# Patient Record
Sex: MALE | Race: WHITE | NOT HISPANIC OR LATINO | Employment: FULL TIME | URBAN - METROPOLITAN AREA
[De-identification: names, ages, dates, MRNs, and addresses within clinical notes are randomized per-mention and may not be internally consistent; named-entity substitution may affect disease eponyms.]

---

## 2023-10-04 DIAGNOSIS — I25.2 OLD MYOCARDIAL INFARCTION: ICD-10-CM

## 2023-10-06 RX ORDER — CLOPIDOGREL BISULFATE 75 MG/1
75 TABLET ORAL DAILY
Qty: 90 TABLET | Refills: 3 | Status: SHIPPED | OUTPATIENT
Start: 2023-10-06

## 2023-12-04 DIAGNOSIS — E78.5 HYPERLIPIDEMIA, UNSPECIFIED: Primary | ICD-10-CM

## 2023-12-05 RX ORDER — ATORVASTATIN CALCIUM 80 MG/1
80 TABLET, FILM COATED ORAL DAILY
Qty: 90 TABLET | Refills: 0 | Status: SHIPPED | OUTPATIENT
Start: 2023-12-05 | End: 2024-02-20 | Stop reason: SDUPTHER

## 2024-02-19 DIAGNOSIS — E78.5 HYPERLIPIDEMIA, UNSPECIFIED: Primary | ICD-10-CM

## 2024-02-20 RX ORDER — ATORVASTATIN CALCIUM 80 MG/1
80 TABLET, FILM COATED ORAL DAILY
Qty: 90 TABLET | Refills: 0 | Status: SHIPPED | OUTPATIENT
Start: 2024-02-20 | End: 2024-05-17

## 2024-02-20 RX ORDER — ATORVASTATIN CALCIUM 80 MG/1
80 TABLET, FILM COATED ORAL DAILY
Qty: 90 TABLET | Refills: 0 | OUTPATIENT
Start: 2024-02-20

## 2024-02-26 PROBLEM — R53.83 FATIGUE: Status: ACTIVE | Noted: 2019-10-21

## 2024-02-26 PROBLEM — K21.9 GERD (GASTROESOPHAGEAL REFLUX DISEASE): Status: ACTIVE | Noted: 2024-02-26

## 2024-02-26 PROBLEM — M48.02 FORAMINAL STENOSIS OF CERVICAL REGION: Status: ACTIVE | Noted: 2024-02-26

## 2024-02-26 PROBLEM — F17.200 NICOTINE DEPENDENCE: Status: RESOLVED | Noted: 2024-02-26 | Resolved: 2024-02-26

## 2024-02-26 PROBLEM — H69.93 DISORDER OF BOTH EUSTACHIAN TUBES: Status: ACTIVE | Noted: 2024-02-26

## 2024-02-26 PROBLEM — M77.31 CALCANEAL SPUR OF RIGHT FOOT: Status: ACTIVE | Noted: 2024-02-26

## 2024-02-26 PROBLEM — M25.511 ACUTE PAIN OF RIGHT SHOULDER: Status: ACTIVE | Noted: 2024-02-26

## 2024-02-26 PROBLEM — N40.0 BENIGN ENLARGEMENT OF PROSTATE: Status: ACTIVE | Noted: 2024-02-26

## 2024-02-26 PROBLEM — R51.9 HEADACHE, OCCIPITAL: Status: ACTIVE | Noted: 2019-10-21

## 2024-02-26 PROBLEM — M76.61 ACHILLES TENDINITIS OF RIGHT LOWER EXTREMITY: Status: ACTIVE | Noted: 2024-02-26

## 2024-02-26 PROBLEM — M25.559 ARTHRALGIA OF HIP: Status: ACTIVE | Noted: 2024-02-26

## 2024-02-26 PROBLEM — L84 CALLUS OF FOOT: Status: ACTIVE | Noted: 2024-02-26

## 2024-02-26 PROBLEM — R10.13 DYSPEPSIA: Status: ACTIVE | Noted: 2024-02-26

## 2024-02-26 PROBLEM — J02.9 ACUTE PHARYNGITIS: Status: ACTIVE | Noted: 2019-09-14

## 2024-02-26 PROBLEM — K29.90 GASTRITIS AND DUODENITIS: Status: ACTIVE | Noted: 2024-02-26

## 2024-02-26 PROBLEM — H60.399 ACUTE INFECTIVE OTITIS EXTERNA: Status: ACTIVE | Noted: 2019-09-14

## 2024-02-26 PROBLEM — M21.959 DEFORMITY OF LOWER EXTREMITY: Status: ACTIVE | Noted: 2024-02-26

## 2024-02-26 PROBLEM — I25.2 HISTORY OF NON-ST ELEVATION MYOCARDIAL INFARCTION (NSTEMI): Status: ACTIVE | Noted: 2024-02-26

## 2024-02-26 PROBLEM — K44.9 HIATAL HERNIA: Status: ACTIVE | Noted: 2024-02-26

## 2024-02-26 PROBLEM — I25.10 CAD (CORONARY ARTERY DISEASE): Status: ACTIVE | Noted: 2024-02-26

## 2024-02-26 PROBLEM — Z95.5 PRESENCE OF STENT IN RIGHT CORONARY ARTERY: Status: ACTIVE | Noted: 2024-02-26

## 2024-02-26 PROBLEM — R06.83 SNORING: Status: ACTIVE | Noted: 2024-02-26

## 2024-02-26 PROBLEM — R06.02 MILD SHORTNESS OF BREATH: Status: ACTIVE | Noted: 2024-02-26

## 2024-02-26 PROBLEM — E78.1 HIGH TRIGLYCERIDES: Status: ACTIVE | Noted: 2024-02-26

## 2024-02-26 PROBLEM — E78.5 HYPERLIPIDEMIA: Status: ACTIVE | Noted: 2024-02-26

## 2024-02-26 PROBLEM — R59.0 LYMPHADENOPATHY, CERVICAL: Status: ACTIVE | Noted: 2024-02-26

## 2024-02-26 PROBLEM — D17.20 LIPOMA OF LOWER EXTREMITY: Status: ACTIVE | Noted: 2024-02-26

## 2024-02-27 ENCOUNTER — OFFICE VISIT (OUTPATIENT)
Dept: CARDIOLOGY | Facility: HOSPITAL | Age: 64
End: 2024-02-27
Payer: COMMERCIAL

## 2024-02-27 VITALS
HEART RATE: 53 BPM | WEIGHT: 194 LBS | SYSTOLIC BLOOD PRESSURE: 116 MMHG | OXYGEN SATURATION: 98 % | BODY MASS INDEX: 27.77 KG/M2 | DIASTOLIC BLOOD PRESSURE: 70 MMHG | HEIGHT: 70 IN

## 2024-02-27 DIAGNOSIS — I25.10 CORONARY ARTERY DISEASE INVOLVING NATIVE CORONARY ARTERY OF NATIVE HEART, UNSPECIFIED WHETHER ANGINA PRESENT: ICD-10-CM

## 2024-02-27 DIAGNOSIS — Z95.5 PRESENCE OF STENT IN RIGHT CORONARY ARTERY: ICD-10-CM

## 2024-02-27 DIAGNOSIS — E78.5 HYPERLIPIDEMIA, UNSPECIFIED HYPERLIPIDEMIA TYPE: Primary | ICD-10-CM

## 2024-02-27 PROCEDURE — 99214 OFFICE O/P EST MOD 30 MIN: CPT | Performed by: NURSE PRACTITIONER

## 2024-02-27 PROCEDURE — 93005 ELECTROCARDIOGRAM TRACING: CPT | Performed by: NURSE PRACTITIONER

## 2024-02-27 PROCEDURE — 93010 ELECTROCARDIOGRAM REPORT: CPT | Performed by: INTERNAL MEDICINE

## 2024-02-27 RX ORDER — ASPIRIN 81 MG/1
1 TABLET ORAL DAILY
COMMUNITY
Start: 2019-04-05

## 2024-02-27 NOTE — PATIENT INSTRUCTIONS
Continue current cardiovascular medications  Check blood work CBC, CMP, and lipid panel  Follow up in 1 year  Stop smoking!

## 2024-02-27 NOTE — PROGRESS NOTES
Primary Care Physician: Fausto Barger DO  Date of Visit: 02/27/2024  4:00 PM EST  Location of visit: [facility]     Chief Complaint:   Chief Complaint   Patient presents with    Follow-up    Hyperlipidemia    Coronary Artery Disease        HPI / Summary:   Placido Ash is a 63 y.o. male presents for followup. Seen in collaboration with Dr. Moody. He has no particular complaints. He is physically active and exercises walking/running for 1 mile and lifting weights 2-3 times per week without chest pain or shortness of breath. He restarted smoking. The patient denies chest pain, shortness of breath, palpitations, lightheadedness, syncope, orthopnea, paroxysmal nocturnal dyspnea, lower extremity edema, or bleeding problems.              Past Medical History:  Past Medical History:   Diagnosis Date    Abnormal weight loss 09/27/2019    Weight loss, unintentional    Anorexia 09/27/2019    Poor appetite    Fever, unspecified 09/27/2019    Fever, unknown origin    Headache, unspecified 09/27/2019    Intractable headache    Nausea 09/27/2019    Nausea in adult    Old myocardial infarction 08/13/2019    History of MI (myocardial infarction)    Personal history of other diseases of the respiratory system 09/12/2019    History of sore throat    Personal history of other infectious and parasitic diseases 11/11/2019    History of viral infection    Personal history of other specified conditions 11/11/2019    History of gross hematuria    Personal history of other specified conditions 11/11/2019    History of weakness    Personal history of other specified conditions 09/27/2019    History of heartburn    Personal history of other specified conditions 11/11/2019    History of weight loss    Presence of coronary angioplasty implant and graft 08/13/2019    History of coronary artery stent placement    Radiculopathy, cervical region 07/26/2017    Acute cervical radiculopathy        Past Surgical History:  Past Surgical  "History:   Procedure Laterality Date    MR HEAD ANGIO WO IV CONTRAST  2/10/2020    MR HEAD ANGIO WO IV CONTRAST 2/10/2020 [facility]LL[facility]    OTHER SURGICAL HISTORY  03/09/2022    Heart surgery    OTHER SURGICAL HISTORY  03/09/2022    Neck surgery    TONSILLECTOMY  01/29/2014    Tonsillectomy          Social History:   reports that he has been smoking cigarettes. He started smoking about 44 years ago. He has a 22.00 pack-year smoking history. He has never used smokeless tobacco.     Family History:  family history is not on file.      Allergies:  Allergies   Allergen Reactions    Hay Fever And Allergy Relief Itching and Runny nose       Outpatient Medications:  Current Outpatient Medications   Medication Instructions    aspirin 81 mg EC tablet 1 tablet, oral, Daily    atorvastatin (LIPITOR) 80 mg, oral, Daily    clopidogrel (PLAVIX) 75 mg, oral, Daily       Physical Exam:  Vitals:    02/27/24 1522   BP: 133/82   BP Location: Left arm   Patient Position: Sitting   BP Cuff Size: Adult   Pulse: 53   SpO2: 98%   Weight: 88 kg (194 lb)   Height: 1.778 m (5' 10\")     Wt Readings from Last 5 Encounters:   02/27/24 88 kg (194 lb)   03/01/23 93 kg (205 lb)   09/06/22 88.5 kg (195 lb 0.5 oz)   03/09/22 86.2 kg (190 lb)   02/23/22 89.8 kg (198 lb)     Body mass index is 27.84 kg/m².     GENERAL: alert, cooperative, pleasant, in no acute distress  SKIN: warm and dry  NECK: Normal JVD, negative HJR  CARDIAC: Regular rate and rhythm with no rubs, murmurs, or gallops  CHEST: Normal respiratory efforts, lungs clear to auscultation bilaterally.  ABDOMEN: soft, nontender, nondistended  EXTREMITIES: no edema, +2 palpable RP and PT pulses bilaterally       Last Labs:  Recent Labs     02/10/23  0808 11/18/21  0920 09/11/20  0818   WBC 7.2 6.6 6.7   HGB 14.4 14.3 14.1   HCT 45.1 45.8 45.1    233 215   MCV 89 93 94     Recent Labs     02/10/23  0808 11/18/21  0920 09/11/20  0818    140 141   K 4.4 4.6 4.5    106 105 "   BUN 18 16 15   CREATININE 1.13 1.15 0.96     CMP -  Lab Results   Component Value Date    CALCIUM 9.6 02/10/2023    PROT 6.9 02/10/2023    ALBUMIN 4.2 02/10/2023    AST 21 02/10/2023    ALT 21 02/10/2023    ALKPHOS 48 02/10/2023    BILITOT 0.5 02/10/2023       LIPID PANEL -   Lab Results   Component Value Date    CHOL 108 02/10/2023    HDL 39.3 (A) 02/10/2023    LDLF 54 02/10/2023    TRIG 76 02/10/2023       Lab Results   Component Value Date    BNP 20 04/04/2019    HGBA1C 5.6 09/18/2019       Last Cardiology Tests:  ECG:  Obtained and reviewed EKG- Sinus bradycardia HR 53    Echo:  Echo Results:  4/5/2019   CONCLUSIONS:   1. The left ventricular systolic function is normal with a 60-65% estimated ejection fraction.   2. Basal and mid inferior wall is abnormal.      Cath:  4/5/2019  Coronary Lesion Summary:  Vessel      Stenosis      Vessel Segment  LAD    10 to 30% stenosis    proximal  OM 1      30% stenosis       proximal  RCA       80% stenosis       proximal  RCA      100% stenosis         mid    CONCLUSIONS:   1. Successsful PCI of the proximal and mid RCA with overlapping 3.5 x 38mm and 3.5 x 38mm Resolute [place] drug-eluting stents postdilated to 3.75mm.   2. Severe single vessel CAD in a right dominant system.   3. Elevated LVEDP.   4. No aortic stenosis.       Assessment/Plan     Placido was seen today for follow-up, hyperlipidemia and coronary artery disease.  Diagnoses and all orders for this visit:  Hyperlipidemia, unspecified hyperlipidemia type (Primary)  -     ECG 12 lead (Clinic Performed)  -     Lipid panel; Future  Coronary artery disease involving native coronary artery of native heart, unspecified whether angina present  -     CBC; Future  -     Comprehensive metabolic panel; Future  -     Lipid panel; Future  Presence of stent in right coronary artery      In summary, Mr. Ash is a pleasant 63 year-old white male with a past medical history significant for coronary artery disease status  post PCI to his RCA in the setting of a non-ST elevation myocardial infarction with preserved LV function, hyperlipidemia, prior chronic tobacco use, and a family history of premature coronary disease and sudden death. He is asymptomatic from a cardiac perspective. He should continue his current cardiovascular medications. I have ordered blood work as above. I have spent more than 3 minutes of time counseling patient to stop smoking. He is going to try quit. She will continue current cardiovascular medications. He will follow up in 1 year.     Orders:  No orders of the defined types were placed in this encounter.     Followup Appts:  Future Appointments   Date Time Provider Department Center   2/27/2024  4:00 PM PEPITO Abbott [id number]           ____________________________________________________________  PEPITO Abbott  [facility]  [facility]

## 2024-03-01 LAB
ATRIAL RATE: 53 BPM
P AXIS: 70 DEGREES
P OFFSET: 199 MS
P ONSET: 137 MS
PR INTERVAL: 166 MS
Q ONSET: 220 MS
QRS COUNT: 8 BEATS
QRS DURATION: 100 MS
QT INTERVAL: 416 MS
QTC CALCULATION(BAZETT): 390 MS
QTC FREDERICIA: 399 MS
R AXIS: 81 DEGREES
T AXIS: 45 DEGREES
T OFFSET: 428 MS
VENTRICULAR RATE: 53 BPM

## 2024-05-17 DIAGNOSIS — E78.5 HYPERLIPIDEMIA, UNSPECIFIED: ICD-10-CM

## 2024-05-17 RX ORDER — ATORVASTATIN CALCIUM 80 MG/1
80 TABLET, FILM COATED ORAL DAILY
Qty: 90 TABLET | Refills: 3 | Status: SHIPPED | OUTPATIENT
Start: 2024-05-17 | End: 2025-05-17

## 2024-07-25 NOTE — PROGRESS NOTES
Subjective   Patient ID: Placido Ash is a 64 y.o. male who presents for Annual Exam.    Past Medical, Surgical, and Family History reviewed and updated in chart.    Reviewed all medications by prescribing practitioner or clinical pharmacist (such as prescriptions, OTCs, herbal therapies and supplements) and documented in the medical record.    HPI  1. Annual Physical Exam  Colonoscopy: last done 3/24/2014 with a ten year clearance; he is due for repeat at this time   Immunizations: Needs Shingrix but declined at this time; Tdap given on 8/2017  Overall Placido is doing well and enjoys golfing during the summer. He has a 17 month old grandson.     2. Hyperlipidemia/CAD  Placido follows with Dr. Moody and had a follow-up appointment on 2/2024   Last lipid panel was completed on 2/10/2023 and was overall unremarkable.   He is s/p PCI with placement in stent in the RCA currently on lipitor 80 mg and Plavix  Placido is on dual anti-platelet therapy on Plavix 75 mg and ASA 81 mg    3. Right heel spur  Placido saw Dr. Dillard who made him an insole two years prior that significantly improved his symptoms  He is interested in scheduling a follow-up appointment at this time to obtain another pair  Otherwise, no other orthopedic concerns    Review of Systems  All pertinent positive symptoms are included in the history of present illness.    All other systems have been reviewed and are negative and noncontributory to this patient's current ailments.    Past Medical History:   Diagnosis Date    Abnormal weight loss 09/27/2019    Weight loss, unintentional    Anorexia 09/27/2019    Poor appetite    Fever, unspecified 09/27/2019    Fever, unknown origin    Headache, unspecified 09/27/2019    Intractable headache    Nausea 09/27/2019    Nausea in adult    Old myocardial infarction 08/13/2019    History of MI (myocardial infarction)    Personal history of other diseases of the respiratory system 09/12/2019    History of  sore throat    Personal history of other infectious and parasitic diseases 11/11/2019    History of viral infection    Personal history of other specified conditions 11/11/2019    History of gross hematuria    Personal history of other specified conditions 11/11/2019    History of weakness    Personal history of other specified conditions 09/27/2019    History of heartburn    Personal history of other specified conditions 11/11/2019    History of weight loss    Presence of coronary angioplasty implant and graft 08/13/2019    History of coronary artery stent placement    Radiculopathy, cervical region 07/26/2017    Acute cervical radiculopathy     Past Surgical History:   Procedure Laterality Date    MR HEAD ANGIO WO IV CONTRAST  2/10/2020    MR HEAD ANGIO WO IV CONTRAST 2/10/2020 [facility]LL[facility]    OTHER SURGICAL HISTORY  03/09/2022    Heart surgery    OTHER SURGICAL HISTORY  03/09/2022    Neck surgery    TONSILLECTOMY  01/29/2014    Tonsillectomy     Social History     Tobacco Use    Smoking status: Every Day     Current packs/day: 0.50     Average packs/day: 0.5 packs/day for 44.6 years (22.3 ttl pk-yrs)     Types: Cigarettes     Start date: 1980     Passive exposure: Past    Smokeless tobacco: Never   Substance Use Topics    Alcohol use: Not Currently    Drug use: Never     Family History   Problem Relation Name Age of Onset    Coronary artery disease Father         Immunization History   Administered Date(s) Administered    Moderna SARS-CoV-2 Vaccination 12/21/2021, 01/22/2022     Current Outpatient Medications   Medication Instructions    aspirin 81 mg EC tablet 1 tablet, oral, Daily    atorvastatin (LIPITOR) 80 mg, oral, Daily    clopidogrel (PLAVIX) 75 mg, oral, Daily     Allergies   Allergen Reactions    Hay Fever And Allergy Relief Itching and Runny nose     Objective   Vitals:    07/26/24 0847   BP: 106/70   BP Location: Left arm   Patient Position: Sitting   BP Cuff Size: Adult   Pulse: 59   SpO2:  "98%   Weight: 83.9 kg (185 lb)   Height: 1.778 m (5' 10\")     Body mass index is 26.54 kg/m².    BP Readings from Last 3 Encounters:   07/26/24 106/70   02/27/24 116/70   03/01/23 122/70      Wt Readings from Last 3 Encounters:   07/26/24 83.9 kg (185 lb)   02/27/24 88 kg (194 lb)   03/01/23 93 kg (205 lb)      Office Visit on 07/26/2024   Component Date Value    POC Color, Urine 07/26/2024 Yellow     POC Appearance, Urine 07/26/2024 Clear     POC Glucose, Urine 07/26/2024 NEGATIVE     POC Bilirubin, Urine 07/26/2024 NEGATIVE     POC Ketones, Urine 07/26/2024 NEGATIVE     POC Specific Gravity, Ur* 07/26/2024 1.010     POC Blood, Urine 07/26/2024 NEGATIVE     POC PH, Urine 07/26/2024 6.0     POC Protein, Urine 07/26/2024 NEGATIVE     POC Urobilinogen, Urine 07/26/2024 0.2     Poc Nitrite, Urine 07/26/2024 NEGATIVE     POC Leukocytes, Urine 07/26/2024 NEGATIVE      Physical Exam  CONSTITUTIONAL - well nourished, well developed, looks like stated age, in no acute distress, not ill-appearing, and not tired appearing  SKIN - normal skin color and pigmentation, normal skin turgor without rash, lesions, or nodules visualized  HEAD - no trauma, normocephalic  EYES - extraocular muscles are intact, and normal external exam  CHEST - clear to auscultation, no wheezing, no crackles and no rales, good effort  CARDIAC - bradycardic rate and regular rhythm, no skipped beats, no murmur  ABDOMEN - no organomegaly, soft, nontender, non-distended  EXTREMITIES - no obvious or evident edema, no obvious or evident deformities  NEUROLOGICAL - alert, oriented and no focal signs  PSYCHIATRIC - alert, pleasant and cordial, age-appropriate    Assessment/Plan   Problem List Items Addressed This Visit       Calcaneal spur of right foot     Referral placed for podiatry  Please schedule at your earliest convenience         Relevant Orders    Referral to Podiatry    Hyperlipidemia     We will repeat your lipid panel at this time. Please continue " to take your statin and blood thinners as prescribed, and follow-up with Dr. Moody per protocol.          Presence of stent in right coronary artery     Continue to follow with cardiology per protocol         Annual physical exam - Primary     Complete history and physical examination was performed  EKG reveals marked sinus bradycardia without acute changes  We will notify of test results once available and make treatment recommendations accordingly   Highly recommend Shingrix vaccine, please consider having done in future         Relevant Orders    Prostate Specific Antigen    TSH with reflex to Free T4 if abnormal    HIV 1/2 Antigen/Antibody Screen with Reflex to Confirmation    Hepatitis C Antibody    POCT UA (nonautomated) manually resulted (Completed)    ECG 12 Lead (Completed)     Other Visit Diagnoses       Prostate cancer screening        Relevant Orders    Prostate Specific Antigen    Encounter for screening for HIV        Relevant Orders    HIV 1/2 Antigen/Antibody Screen with Reflex to Confirmation    Need for hepatitis C screening test        Relevant Orders    Hepatitis C Antibody    Colon cancer screening        Time for colon cancer screening  Requisition sent for colonoscopy, please schedule at your convenience    Relevant Orders    Colonoscopy Screening; Average Risk Patient

## 2024-07-26 ENCOUNTER — APPOINTMENT (OUTPATIENT)
Dept: PRIMARY CARE | Facility: CLINIC | Age: 64
End: 2024-07-26
Payer: COMMERCIAL

## 2024-07-26 ENCOUNTER — LAB (OUTPATIENT)
Dept: LAB | Facility: LAB | Age: 64
End: 2024-07-26
Payer: COMMERCIAL

## 2024-07-26 VITALS
OXYGEN SATURATION: 98 % | HEART RATE: 59 BPM | BODY MASS INDEX: 26.48 KG/M2 | HEIGHT: 70 IN | DIASTOLIC BLOOD PRESSURE: 70 MMHG | SYSTOLIC BLOOD PRESSURE: 106 MMHG | WEIGHT: 185 LBS

## 2024-07-26 DIAGNOSIS — Z00.00 ANNUAL PHYSICAL EXAM: Primary | ICD-10-CM

## 2024-07-26 DIAGNOSIS — Z11.4 ENCOUNTER FOR SCREENING FOR HIV: ICD-10-CM

## 2024-07-26 DIAGNOSIS — Z12.11 COLON CANCER SCREENING: ICD-10-CM

## 2024-07-26 DIAGNOSIS — Z00.00 ANNUAL PHYSICAL EXAM: ICD-10-CM

## 2024-07-26 DIAGNOSIS — M77.31 CALCANEAL SPUR OF RIGHT FOOT: ICD-10-CM

## 2024-07-26 DIAGNOSIS — Z11.59 NEED FOR HEPATITIS C SCREENING TEST: ICD-10-CM

## 2024-07-26 DIAGNOSIS — Z12.5 PROSTATE CANCER SCREENING: ICD-10-CM

## 2024-07-26 DIAGNOSIS — Z95.5 PRESENCE OF STENT IN RIGHT CORONARY ARTERY: ICD-10-CM

## 2024-07-26 DIAGNOSIS — E78.2 MIXED HYPERLIPIDEMIA: ICD-10-CM

## 2024-07-26 PROBLEM — J02.9 ACUTE PHARYNGITIS: Status: RESOLVED | Noted: 2019-09-14 | Resolved: 2024-07-26

## 2024-07-26 PROBLEM — M77.9 BONE SPUR: Status: ACTIVE | Noted: 2024-07-26

## 2024-07-26 PROBLEM — K29.90 GASTRITIS AND DUODENITIS: Status: RESOLVED | Noted: 2024-02-26 | Resolved: 2024-07-26

## 2024-07-26 PROBLEM — R10.13 DYSPEPSIA: Status: RESOLVED | Noted: 2024-02-26 | Resolved: 2024-07-26

## 2024-07-26 PROBLEM — H60.399 ACUTE INFECTIVE OTITIS EXTERNA: Status: RESOLVED | Noted: 2019-09-14 | Resolved: 2024-07-26

## 2024-07-26 PROBLEM — R59.0 LYMPHADENOPATHY, CERVICAL: Status: RESOLVED | Noted: 2024-02-26 | Resolved: 2024-07-26

## 2024-07-26 LAB
HCV AB SER QL: NONREACTIVE
HIV 1+2 AB+HIV1 P24 AG SERPL QL IA: NONREACTIVE
POC APPEARANCE, URINE: CLEAR
POC BILIRUBIN, URINE: NEGATIVE
POC BLOOD, URINE: NEGATIVE
POC COLOR, URINE: YELLOW
POC GLUCOSE, URINE: NEGATIVE MG/DL
POC KETONES, URINE: NEGATIVE MG/DL
POC LEUKOCYTES, URINE: NEGATIVE
POC NITRITE,URINE: NEGATIVE
POC PH, URINE: 6 PH
POC PROTEIN, URINE: NEGATIVE MG/DL
POC SPECIFIC GRAVITY, URINE: 1.01
POC UROBILINOGEN, URINE: 0.2 EU/DL
PSA SERPL-MCNC: 0.88 NG/ML
TSH SERPL-ACNC: 2.51 MIU/L (ref 0.44–3.98)

## 2024-07-26 PROCEDURE — 99396 PREV VISIT EST AGE 40-64: CPT | Performed by: FAMILY MEDICINE

## 2024-07-26 PROCEDURE — 36415 COLL VENOUS BLD VENIPUNCTURE: CPT

## 2024-07-26 PROCEDURE — 93000 ELECTROCARDIOGRAM COMPLETE: CPT | Performed by: FAMILY MEDICINE

## 2024-07-26 PROCEDURE — 84153 ASSAY OF PSA TOTAL: CPT

## 2024-07-26 PROCEDURE — 3008F BODY MASS INDEX DOCD: CPT | Performed by: FAMILY MEDICINE

## 2024-07-26 PROCEDURE — 87389 HIV-1 AG W/HIV-1&-2 AB AG IA: CPT

## 2024-07-26 PROCEDURE — 84443 ASSAY THYROID STIM HORMONE: CPT

## 2024-07-26 PROCEDURE — 86803 HEPATITIS C AB TEST: CPT

## 2024-07-26 PROCEDURE — 81002 URINALYSIS NONAUTO W/O SCOPE: CPT | Performed by: FAMILY MEDICINE

## 2024-07-26 ASSESSMENT — PATIENT HEALTH QUESTIONNAIRE - PHQ9
2. FEELING DOWN, DEPRESSED OR HOPELESS: NOT AT ALL
SUM OF ALL RESPONSES TO PHQ9 QUESTIONS 1 AND 2: 0
1. LITTLE INTEREST OR PLEASURE IN DOING THINGS: NOT AT ALL

## 2024-07-26 NOTE — ASSESSMENT & PLAN NOTE
Complete history and physical examination was performed  EKG reveals marked sinus bradycardia without acute changes  We will notify of test results once available and make treatment recommendations accordingly   Highly recommend Shingrix vaccine, please consider having done in future

## 2024-07-26 NOTE — ASSESSMENT & PLAN NOTE
We will repeat your lipid panel at this time. Please continue to take your statin and blood thinners as prescribed, and follow-up with Dr. Moody per protocol.

## 2024-07-26 NOTE — ASSESSMENT & PLAN NOTE
Referral to schedule a follow-up with Dr. Dillard has been placed to be scheduled at your earliest convenience.

## 2024-07-28 NOTE — RESULT ENCOUNTER NOTE
Hepatitis C and HIV are negative.  We did this is a one-time screening as we have been doing this for all patients 18 to 64 years of age, and since negative, will not be part of future blood work  Thyroid, prostate cancer screening test are normal

## 2024-09-27 DIAGNOSIS — I25.2 OLD MYOCARDIAL INFARCTION: ICD-10-CM

## 2024-09-27 RX ORDER — CLOPIDOGREL BISULFATE 75 MG/1
75 TABLET ORAL DAILY
Qty: 90 TABLET | Refills: 3 | Status: SHIPPED | OUTPATIENT
Start: 2024-09-27 | End: 2025-09-27

## 2025-02-26 ENCOUNTER — OFFICE VISIT (OUTPATIENT)
Dept: CARDIOLOGY | Facility: HOSPITAL | Age: 65
End: 2025-02-26
Payer: COMMERCIAL

## 2025-02-26 VITALS
HEART RATE: 58 BPM | RESPIRATION RATE: 18 BRPM | OXYGEN SATURATION: 100 % | BODY MASS INDEX: 27.92 KG/M2 | DIASTOLIC BLOOD PRESSURE: 64 MMHG | WEIGHT: 195 LBS | SYSTOLIC BLOOD PRESSURE: 110 MMHG | HEIGHT: 70 IN

## 2025-02-26 DIAGNOSIS — I25.2 PAST MYOCARDIAL INFARCTION: ICD-10-CM

## 2025-02-26 DIAGNOSIS — E78.5 HYPERLIPIDEMIA, UNSPECIFIED HYPERLIPIDEMIA TYPE: ICD-10-CM

## 2025-02-26 DIAGNOSIS — I25.10 CORONARY ARTERY DISEASE, UNSPECIFIED VESSEL OR LESION TYPE, UNSPECIFIED WHETHER ANGINA PRESENT, UNSPECIFIED WHETHER NATIVE OR TRANSPLANTED HEART: Primary | ICD-10-CM

## 2025-02-26 DIAGNOSIS — Z72.0 TOBACCO USE: ICD-10-CM

## 2025-02-26 PROCEDURE — 99214 OFFICE O/P EST MOD 30 MIN: CPT | Performed by: INTERNAL MEDICINE

## 2025-02-26 PROCEDURE — 3008F BODY MASS INDEX DOCD: CPT | Performed by: INTERNAL MEDICINE

## 2025-02-26 PROCEDURE — 99406 BEHAV CHNG SMOKING 3-10 MIN: CPT | Performed by: INTERNAL MEDICINE

## 2025-02-26 PROCEDURE — 93005 ELECTROCARDIOGRAM TRACING: CPT | Performed by: INTERNAL MEDICINE

## 2025-02-26 NOTE — PROGRESS NOTES
Primary Care Physician: Fausto Barger DO  Date of Visit: 02/26/2025  4:00 PM EST  Location of visit: [facility]     Chief Complaint:   Chief Complaint   Patient presents with    Follow-up        HPI / Summary:   Placido Ash is a 64 y.o. male who presents for followup.  He has no cardiac complaints.  He is generally active and able to perform yard work and go up and down stairs without chest pain or shortness of breath.  The patient denies chest pain, shortness of breath, palpitations, lightheadedness, syncope, orthopnea, paroxysmal nocturnal dyspnea, lower extremity edema, or bleeding problems.          Past Medical History:   Diagnosis Date    Abnormal weight loss 09/27/2019    Weight loss, unintentional    Anorexia 09/27/2019    Poor appetite    Fever, unspecified 09/27/2019    Fever, unknown origin    Headache, unspecified 09/27/2019    Intractable headache    Nausea 09/27/2019    Nausea in adult    Old myocardial infarction 08/13/2019    History of MI (myocardial infarction)    Personal history of other diseases of the respiratory system 09/12/2019    History of sore throat    Personal history of other infectious and parasitic diseases 11/11/2019    History of viral infection    Personal history of other specified conditions 11/11/2019    History of gross hematuria    Personal history of other specified conditions 11/11/2019    History of weakness    Personal history of other specified conditions 09/27/2019    History of heartburn    Personal history of other specified conditions 11/11/2019    History of weight loss    Presence of coronary angioplasty implant and graft 08/13/2019    History of coronary artery stent placement    Radiculopathy, cervical region 07/26/2017    Acute cervical radiculopathy        Past Surgical History:   Procedure Laterality Date    MR HEAD ANGIO WO IV CONTRAST  2/10/2020    MR HEAD ANGIO WO IV CONTRAST 2/10/2020 [facility] ANCILLARY LEGACY    OTHER SURGICAL HISTORY   "03/09/2022    Heart surgery    OTHER SURGICAL HISTORY  03/09/2022    Neck surgery    TONSILLECTOMY  01/29/2014    Tonsillectomy          Social History:   reports that he has been smoking cigarettes. He started smoking about 45 years ago. He has a 22.6 pack-year smoking history. He has been exposed to tobacco smoke. He has never used smokeless tobacco. He reports that he does not currently use alcohol. He reports that he does not use drugs.     Family History:  family history includes Coronary artery disease in his father.      Allergies:  Allergies   Allergen Reactions    Hay Fever And Allergy Relief Itching and Runny nose       Outpatient Medications:  Current Outpatient Medications   Medication Instructions    aspirin 81 mg EC tablet 1 tablet, Daily    atorvastatin (LIPITOR) 80 mg, oral, Daily    clopidogrel (PLAVIX) 75 mg, oral, Daily       Physical Exam:  Vitals:    02/26/25 1536   BP: 110/64   BP Location: Left arm   Patient Position: Sitting   BP Cuff Size: Adult   Pulse: 58   Resp: 18   SpO2: 100%   Weight: 88.5 kg (195 lb)   Height: 1.778 m (5' 10\")     Wt Readings from Last 5 Encounters:   02/26/25 88.5 kg (195 lb)   07/26/24 83.9 kg (185 lb)   02/27/24 88 kg (194 lb)   03/01/23 93 kg (205 lb)   09/06/22 88.5 kg (195 lb 0.5 oz)     Body mass index is 27.98 kg/m².   General: Well-developed well-nourished in no acute distress  HEENT: Normocephalic atraumatic  Neck: Supple, JVP is normal negative hepatojugular reflux 2+ carotid pulses without bruit  Pulmonary: Normal respiratory effort, clear to auscultation  Cardiovascular: No right ventricular heave, normal S1 and S2, no murmurs rubs or gallops  Abdomen: Soft nontender nondistended  Extremities: Warm without edema 2+ radial pulses bilaterally 2+ posterior tibial pulses bilaterally  Neurologic: Alert and oriented x3  Psychiatric: Normal mood and affect     Last Labs:  CMP:  Recent Labs     02/10/23  0808 11/18/21  0920 09/11/20  0818    140 141   K 4.4 " 4.6 4.5    106 105   CO2 29 29 29   ANIONGAP 12 10 12   BUN 18 16 15   CREATININE 1.13 1.15 0.96   GLUCOSE 83 87 85     Recent Labs     02/10/23  0808 11/18/21  0920 09/11/20  0818   ALBUMIN 4.2 4.3 4.2   ALKPHOS 48 56 55   ALT 21 24 20   AST 21 25 23   BILITOT 0.5 0.7 0.8     CBC:  Recent Labs     02/10/23  0808 11/18/21  0920 09/11/20  0818   WBC 7.2 6.6 6.7   HGB 14.4 14.3 14.1   HCT 45.1 45.8 45.1    233 215   MCV 89 93 94     COAG:   Recent Labs     04/04/19  1220   INR 1.0   DDIMERVTE <215     HEME/ENDO:  Recent Labs     07/26/24  0857 02/10/23  0808 11/18/21  0920 09/18/19  0618   TSH 2.51 2.08 2.47  --    HGBA1C  --   --   --  5.6      CARDIAC:   Recent Labs     04/04/19  1220   BNP 20     Recent Labs     02/10/23  0808 11/18/21  0920 09/11/20  0818   CHOL 108 108 93   LDLF 54 48 37   HDL 39.3* 45.4 45.1   TRIG 76 71 53       Last Cardiology Tests:  ECG:  An electrocardiogram performed today that I reviewed shows sinus bradycardia possible prior inferior infarct.    Echo:  Echo Results:  4/5/2019   CONCLUSIONS:   1. The left ventricular systolic function is normal with a 60-65% estimated ejection fraction.   2. Basal and mid inferior wall is abnormal.       Cath:  4/5/2019  Coronary Lesion Summary:  Vessel      Stenosis      Vessel Segment  LAD    10 to 30% stenosis    proximal  OM 1      30% stenosis       proximal  RCA       80% stenosis       proximal  RCA      100% stenosis         mid     CONCLUSIONS:   1. Successsful PCI of the proximal and mid RCA with overlapping 3.5 x 38mm and 3.5 x 38mm Resolute [place] drug-eluting stents postdilated to 3.75mm.   2. Severe single vessel CAD in a right dominant system.   3. Elevated LVEDP.   4. No aortic stenosis.    Stress Test:  Stress Results:  No results found for this or any previous visit from the past 365 days.         Cardiac Imaging:        Assessment/Plan   Diagnoses and all orders for this visit:  Coronary artery disease, unspecified vessel or  lesion type, unspecified whether angina present, unspecified whether native or transplanted heart  -     ECG 12 lead (Clinic Performed)  -     CBC; Future  Hyperlipidemia, unspecified hyperlipidemia type  -     Lipid Panel; Future  -     Comprehensive Metabolic Panel; Future  -     Hemoglobin A1C; Future  Tobacco use  Past myocardial infarction    In summary, Mr. Ash is a pleasant 64 year-old white male with a past medical history significant for coronary artery disease status post PCI to his RCA in the setting of a non-ST elevation myocardial infarction with preserved LV function, hyperlipidemia, prior chronic tobacco use, and a family history of premature coronary disease and sudden death.  He is asymptomatic from a cardiac perspective.  His blood pressure is well-controlled.  I did order fasting labs as indicated below.  I encouraged him to increase his physical activity.  I strongly encouraged him to stop smoking and spent more than 3 minutes of time counseling him to do so.  He should continue his current cardiovascular medications.  We will see him back in follow-up in 1 year.      Orders:  Orders Placed This Encounter   Procedures    Lipid Panel    Comprehensive Metabolic Panel    CBC    Hemoglobin A1C    ECG 12 lead (Clinic Performed)      Followup Appts:  Future Appointments   Date Time Provider Department Center   2/26/2025  4:00 PM Errol Moody MD [id number]   3/3/2026  3:00 PM Dulce Ernst, APRN-CNP [id number]           ____________________________________________________________  Errol Moody MD  [facility] [place]  [facility]

## 2025-02-27 LAB
ATRIAL RATE: 58 BPM
P AXIS: 69 DEGREES
P OFFSET: 191 MS
P ONSET: 130 MS
PR INTERVAL: 166 MS
Q ONSET: 213 MS
QRS COUNT: 9 BEATS
QRS DURATION: 98 MS
QT INTERVAL: 418 MS
QTC CALCULATION(BAZETT): 410 MS
QTC FREDERICIA: 413 MS
R AXIS: 76 DEGREES
T AXIS: 27 DEGREES
T OFFSET: 422 MS
VENTRICULAR RATE: 58 BPM

## 2025-05-07 DIAGNOSIS — E78.5 HYPERLIPIDEMIA, UNSPECIFIED: ICD-10-CM

## 2025-05-07 RX ORDER — ATORVASTATIN CALCIUM 80 MG/1
80 TABLET, FILM COATED ORAL DAILY
Qty: 90 TABLET | Refills: 3 | Status: SHIPPED | OUTPATIENT
Start: 2025-05-07 | End: 2026-05-07

## 2025-06-24 ENCOUNTER — OFFICE VISIT (OUTPATIENT)
Dept: ORTHOPEDIC SURGERY | Facility: CLINIC | Age: 65
End: 2025-06-24
Payer: COMMERCIAL

## 2025-06-24 ENCOUNTER — HOSPITAL ENCOUNTER (OUTPATIENT)
Dept: RADIOLOGY | Facility: CLINIC | Age: 65
Discharge: HOME | End: 2025-06-24
Payer: COMMERCIAL

## 2025-06-24 DIAGNOSIS — M54.2 CERVICAL PAIN: ICD-10-CM

## 2025-06-24 DIAGNOSIS — M54.12 CERVICAL RADICULOPATHY: ICD-10-CM

## 2025-06-24 PROCEDURE — 72040 X-RAY EXAM NECK SPINE 2-3 VW: CPT

## 2025-06-24 PROCEDURE — 99212 OFFICE O/P EST SF 10 MIN: CPT | Performed by: ORTHOPAEDIC SURGERY

## 2025-06-24 PROCEDURE — 99213 OFFICE O/P EST LOW 20 MIN: CPT | Performed by: ORTHOPAEDIC SURGERY

## 2025-06-24 PROCEDURE — 72040 X-RAY EXAM NECK SPINE 2-3 VW: CPT | Performed by: RADIOLOGY

## 2025-06-24 NOTE — PROGRESS NOTES
Kevin returns after a lengthy absence.  He is a very pleasant 65-year-old man who has had a prior anterior cervical decompression and fusion.    He has done very well over time.    About 3 weeks ago, after lifting a heavy object he developed the rather abrupt onset of symptoms in his right shoulder and upper arm.  He thought that it might initially be related to his shoulder.    He is better at this point.  He did want to keep the appointment just to be evaluated.    He has some mild numbness in his right upper arm but no symptoms below the elbow.    Over time he is really done quite well.    Family, social, and medical histories are obtained and reviewed.    30-point, patient-recorded Review of Systems is personally obtained and reviewed. Inclusive is no history of weight loss, change in appetite, recent change in activity level, change in bowel or bladder habits, fevers, chills, malaise, or night pain.    Healthy-appearing patient no acute distress.  Stable gait. Tandem ambulation without difficulty. Painless motion cervical spine. Negative Lhermitte's. Strength is intact both upper and lower extremities. Sensation intact. No hyporeflexia upper or lower extremities.    He has near full forward flexion and abduction of his right shoulder but he does have some limited internal rotation and a mildly positive impingement sign.  Again, strength is intact.    Plain films show that his remote fusions at C5-6 and C6-7 are healed.  He does have some adjacent level degenerative change at C4-5.    Impression: He developed an episode of rather severe right shoulder and upper arm pain.  It sounds as though this may be radicular in nature.  Given his improvement I would hold off on any aggressive treatment at this point.  Some of this might be shoulder related as well as he does have some limited internal rotation.    I have suggested that he continue with an exercise and conditioning program.  If his symptoms were to recur at  any severity, advanced imaging of his neck and right shoulder would be indicated.    He has good insight about this.    He will keep us updated on his progress.    ** Dictated with voice recognition software and not immediately reviewed for errors in grammar and/or spelling **

## 2025-06-24 NOTE — LETTER
June 24, 2025     Fausto Barger DO  [address]  [facility], [address]  [facility] [place] [address]    Patient: Placido Ash   YOB: 1960   Date of Visit: 6/24/2025       Dear Dr. Fausto Barger, :    Thank you for referring Placido Ash to me for evaluation. Below are my notes for this consultation.  If you have questions, please do not hesitate to call me. I look forward to following your patient along with you.       Sincerely,     Tomy Jtet MD      CC: No Recipients  ______________________________________________________________________________________    Kevin returns after a lengthy absence.  He is a very pleasant 65-year-old man who has had a prior anterior cervical decompression and fusion.    He has done very well over time.    About 3 weeks ago, after lifting a heavy object he developed the rather abrupt onset of symptoms in his right shoulder and upper arm.  He thought that it might initially be related to his shoulder.    He is better at this point.  He did want to keep the appointment just to be evaluated.    He has some mild numbness in his right upper arm but no symptoms below the elbow.    Over time he is really done quite well.    Family, social, and medical histories are obtained and reviewed.    30-point, patient-recorded Review of Systems is personally obtained and reviewed. Inclusive is no history of weight loss, change in appetite, recent change in activity level, change in bowel or bladder habits, fevers, chills, malaise, or night pain.    Healthy-appearing patient no acute distress.  Stable gait. Tandem ambulation without difficulty. Painless motion cervical spine. Negative Lhermitte's. Strength is intact both upper and lower extremities. Sensation intact. No hyporeflexia upper or lower extremities.    He has near full forward flexion and abduction of his right shoulder but he does have some limited internal rotation and a mildly positive  impingement sign.  Again, strength is intact.    Plain films show that his remote fusions at C5-6 and C6-7 are healed.  He does have some adjacent level degenerative change at C4-5.    Impression: He developed an episode of rather severe right shoulder and upper arm pain.  It sounds as though this may be radicular in nature.  Given his improvement I would hold off on any aggressive treatment at this point.  Some of this might be shoulder related as well as he does have some limited internal rotation.    I have suggested that he continue with an exercise and conditioning program.  If his symptoms were to recur at any severity, advanced imaging of his neck and right shoulder would be indicated.    He has good insight about this.    He will keep us updated on his progress.    ** Dictated with voice recognition software and not immediately reviewed for errors in grammar and/or spelling **

## 2025-07-18 ENCOUNTER — EVALUATION (OUTPATIENT)
Dept: PHYSICAL THERAPY | Facility: CLINIC | Age: 65
End: 2025-07-18
Payer: COMMERCIAL

## 2025-07-18 DIAGNOSIS — M25.511 ACUTE PAIN OF RIGHT SHOULDER: Primary | ICD-10-CM

## 2025-07-18 PROCEDURE — 97161 PT EVAL LOW COMPLEX 20 MIN: CPT | Mod: GP | Performed by: PHYSICAL THERAPIST

## 2025-07-18 ASSESSMENT — ENCOUNTER SYMPTOMS
OCCASIONAL FEELINGS OF UNSTEADINESS: 0
LOSS OF SENSATION IN FEET: 0
DEPRESSION: 0

## 2025-07-18 NOTE — PROGRESS NOTES
Physical Therapy Evaluation    Patient Name: Placido Ash  MRN: [id number]  Today's Date: 7/18/2025  Visit: 1/60 visits per year, $50.00 co pay  Referred by: Dr. Jett  Time Calculation  Start Time: 0803  Stop Time: 0825  Time Calculation (min): 22 min  Diagnosis:   1. Acute pain of right shoulder            PRECAUTIONS:   C5-7 ACDF 2017    SUBJECTIVE:  Patient reports onset of (R) shoulder about a month ago. He was pulling a heavier duffel back off of a shelf and catching it with his (R)UE. This pain has since subsided. Currently back to his pre-injury status  Pain:  0/10 (R)UE or cervical spine  Home Living:  No concerns  Prior level of function:  IADLS  Personal factors that may impact care:    OBJECTIVE:  Cervical AROM: (degrees)   Flexion 40   Extension 49   Right Sidebend 15   Left Sidebend 15   Right Rotation 64   Left Rotation 60     No radicular RUE symptoms with cervical motion testing both sustained and repeated    Posture: Flattening of cervical lordosis with rounded shoulders posturing.  Palpation: No TTP (R) shoulder or cervical spine  Special tests:  Neg spurlings  Dermatomal Impairment: None  Myotomal Impairment: None, RUE 5/5 in all planes    Shoulder AROM/PROM WNL and non painful    Shoulder Strength: MMT Left Right   Flexion /5 5/5   Abduction /5 5/5   External Rotation /5 5/5   Internal Rotation /5 5/5     Outcome Measure:  NDI 4/50    ASSESSMENT:  Patient is a 65 year old male who presents to therapy on this date for evaluation of their (R) shoulder pain; which has resolved since appointment set-up. Examination on this date reveals no deficits with cervical radicular symptoms or (R)UE cuff involvement. Patient's symptom expression was likely RTC strain due to mechanism of injury but no symptoms prevalent at this time. No care needed at this time.    Low complexity due to patient's clinical presentation being stable and uncomplicated by any significant comorbidities that may affect rehab  tolerance and progression.     Clinical presentation:  Stable and/or uncomplicated characteristics,       TREATMENT:  PATIENT EDUCATION:  Outpatient Education  Individual(s) Educated: Patient  Education Provided: Anatomy, Body Mechanics, Physiology, Posture  Plan of Care Discussed and Agreed Upon: yes  Patient Response to Education: Patient/Caregiver Verbalized Understanding of Information, Patient/Caregiver Performed Return Demonstration of Exercises/Activities, Patient/Caregiver Asked Appropriate Questions    PLAN:   Treatment/Interventions: Education/ Instruction  PT Plan: No Additional PT interventions required at this time  PT Frequency: One time visit  Plan of Care Agreement: Patient    GOALS:  No goals made as patient is asymptomatic at this time and therapy is not needed.  This document will serve as IE/DC; if patient returns due to recurrence of pain, progress note to be performed and new goals/POC will be made.

## 2025-07-25 ENCOUNTER — APPOINTMENT (OUTPATIENT)
Dept: PHYSICAL THERAPY | Facility: CLINIC | Age: 65
End: 2025-07-25
Payer: COMMERCIAL

## 2025-08-02 LAB
ALBUMIN SERPL-MCNC: 4.3 G/DL (ref 3.6–5.1)
ALP SERPL-CCNC: 56 U/L (ref 35–144)
ALT SERPL-CCNC: 19 U/L (ref 9–46)
ANION GAP SERPL CALCULATED.4IONS-SCNC: 6 MMOL/L (CALC) (ref 7–17)
AST SERPL-CCNC: 21 U/L (ref 10–35)
BILIRUB SERPL-MCNC: 0.7 MG/DL (ref 0.2–1.2)
BUN SERPL-MCNC: 13 MG/DL (ref 7–25)
CALCIUM SERPL-MCNC: 9.6 MG/DL (ref 8.6–10.3)
CHLORIDE SERPL-SCNC: 105 MMOL/L (ref 98–110)
CHOLEST SERPL-MCNC: 91 MG/DL
CHOLEST/HDLC SERPL: 2.3 (CALC)
CO2 SERPL-SCNC: 29 MMOL/L (ref 20–32)
CREAT SERPL-MCNC: 0.99 MG/DL (ref 0.7–1.35)
EGFRCR SERPLBLD CKD-EPI 2021: 86 ML/MIN/1.73M2
ERYTHROCYTE [DISTWIDTH] IN BLOOD BY AUTOMATED COUNT: 12.2 % (ref 11–15)
EST. AVERAGE GLUCOSE BLD GHB EST-MCNC: 111 MG/DL
EST. AVERAGE GLUCOSE BLD GHB EST-SCNC: 6.2 MMOL/L
GLUCOSE SERPL-MCNC: 80 MG/DL (ref 65–99)
HBA1C MFR BLD: 5.5 %
HCT VFR BLD AUTO: 42.4 % (ref 38.5–50)
HDLC SERPL-MCNC: 39 MG/DL
HGB BLD-MCNC: 13.7 G/DL (ref 13.2–17.1)
LDLC SERPL CALC-MCNC: 36 MG/DL (CALC)
MCH RBC QN AUTO: 29.7 PG (ref 27–33)
MCHC RBC AUTO-ENTMCNC: 32.3 G/DL (ref 32–36)
MCV RBC AUTO: 92 FL (ref 80–100)
NONHDLC SERPL-MCNC: 52 MG/DL (CALC)
PLATELET # BLD AUTO: 174 THOUSAND/UL (ref 140–400)
PMV BLD REES-ECKER: 9.1 FL (ref 7.5–12.5)
POTASSIUM SERPL-SCNC: 4.4 MMOL/L (ref 3.5–5.3)
PROT SERPL-MCNC: 6.6 G/DL (ref 6.1–8.1)
RBC # BLD AUTO: 4.61 MILLION/UL (ref 4.2–5.8)
SODIUM SERPL-SCNC: 140 MMOL/L (ref 135–146)
TRIGL SERPL-MCNC: 77 MG/DL
WBC # BLD AUTO: 6.9 THOUSAND/UL (ref 3.8–10.8)

## 2025-08-05 ENCOUNTER — APPOINTMENT (OUTPATIENT)
Dept: PRIMARY CARE | Facility: CLINIC | Age: 65
End: 2025-08-05
Payer: COMMERCIAL

## 2025-08-05 VITALS
OXYGEN SATURATION: 96 % | HEIGHT: 70 IN | WEIGHT: 189 LBS | SYSTOLIC BLOOD PRESSURE: 112 MMHG | DIASTOLIC BLOOD PRESSURE: 70 MMHG | BODY MASS INDEX: 27.06 KG/M2 | HEART RATE: 69 BPM

## 2025-08-05 DIAGNOSIS — I25.10 CORONARY ARTERY DISEASE DUE TO LIPID RICH PLAQUE: ICD-10-CM

## 2025-08-05 DIAGNOSIS — Z12.11 SCREENING FOR COLORECTAL CANCER: ICD-10-CM

## 2025-08-05 DIAGNOSIS — Z00.00 ENCOUNTER FOR ANNUAL PHYSICAL EXAM: Primary | ICD-10-CM

## 2025-08-05 DIAGNOSIS — I25.83 CORONARY ARTERY DISEASE DUE TO LIPID RICH PLAQUE: ICD-10-CM

## 2025-08-05 DIAGNOSIS — Z95.5 PRESENCE OF STENT IN RIGHT CORONARY ARTERY: ICD-10-CM

## 2025-08-05 DIAGNOSIS — Z12.12 SCREENING FOR COLORECTAL CANCER: ICD-10-CM

## 2025-08-05 DIAGNOSIS — F17.219 CIGARETTE NICOTINE DEPENDENCE WITH NICOTINE-INDUCED DISORDER: ICD-10-CM

## 2025-08-05 ASSESSMENT — PATIENT HEALTH QUESTIONNAIRE - PHQ9
SUM OF ALL RESPONSES TO PHQ9 QUESTIONS 1 AND 2: 0
1. LITTLE INTEREST OR PLEASURE IN DOING THINGS: NOT AT ALL
2. FEELING DOWN, DEPRESSED OR HOPELESS: NOT AT ALL

## 2025-08-05 NOTE — PROGRESS NOTES
Subjective   Patient ID: Placido Ash is a 65 y.o. male who presents for Annual Exam.         Reviewed all medications by prescribing practitioner or clinical pharmacist (such as prescriptions, OTCs, herbal therapies and supplements) and documented in the medical record.    HPI  1. Annual Physical Exam  Colonoscopy: last done 3/24/2014 with a ten year clearance; he is due for repeat at this time   Immunizations: Needs Shingrix but willing to receive it in the near future; Tdap given on 8/2017  Overall Placido is doing well and enjoys golfing during the summer. He has a 17 month old grandson.   He tries to stay active throughout the day, with a physical job, trying to follow good diet.  Continues smoking at least half a pack per day    2. Hyperlipidemia/CAD  Placido follows with Dr. Moody and had a follow-up appointment on 2/2025   Last lipid panel was completed on 8/2025 and was overall unremarkable.   He is s/p PCI with placement in stent in the RCA currently on lipitor 80 mg and Plavix  Placido is on dual anti-platelet therapy on Plavix 75 mg and ASA 81 mg      Review of Systems  All pertinent positive symptoms are included in the history of present illness.    All other systems have been reviewed and are negative and noncontributory to this patient's current ailments.    Past Medical History:   Diagnosis Date    Abnormal weight loss 09/27/2019    Weight loss, unintentional    Anorexia 09/27/2019    Poor appetite    Fever, unspecified 09/27/2019    Fever, unknown origin    Headache, unspecified 09/27/2019    Intractable headache    Nausea 09/27/2019    Nausea in adult    Old myocardial infarction 08/13/2019    History of MI (myocardial infarction)    Personal history of other diseases of the respiratory system 09/12/2019    History of sore throat    Personal history of other infectious and parasitic diseases 11/11/2019    History of viral infection    Personal history of other specified conditions 11/11/2019  "   History of gross hematuria    Personal history of other specified conditions 11/11/2019    History of weakness    Personal history of other specified conditions 09/27/2019    History of heartburn    Personal history of other specified conditions 11/11/2019    History of weight loss    Presence of coronary angioplasty implant and graft 08/13/2019    History of coronary artery stent placement    Radiculopathy, cervical region 07/26/2017    Acute cervical radiculopathy     Past Surgical History:   Procedure Laterality Date    MR HEAD ANGIO WO IV CONTRAST  2/10/2020    MR HEAD ANGIO WO IV CONTRAST 2/10/2020 [facility]    OTHER SURGICAL HISTORY  03/09/2022    Heart surgery    OTHER SURGICAL HISTORY  03/09/2022    Neck surgery    TONSILLECTOMY  01/29/2014    Tonsillectomy     Social History     Tobacco Use    Smoking status: Every Day     Current packs/day: 0.50     Average packs/day: 0.5 packs/day for 45.6 years (22.8 ttl pk-yrs)     Types: Cigarettes     Start date: 1980     Passive exposure: Past    Smokeless tobacco: Never   Substance Use Topics    Alcohol use: Yes    Drug use: Never     Family History   Problem Relation Name Age of Onset    Coronary artery disease Father         Immunization History   Administered Date(s) Administered    Flu vaccine (IIV4), preservative free *Check age/dose* 10/17/2019, 10/22/2020    Moderna SARS-CoV-2 Vaccination 12/21/2021, 01/22/2022    Td (adult) 11/26/2013    Tdap vaccine, age 7 year and older (BOOSTRIX, ADACEL) 08/30/2017     Current Outpatient Medications   Medication Instructions    aspirin 81 mg EC tablet 1 tablet, Daily    atorvastatin (LIPITOR) 80 mg, oral, Daily    clopidogrel (PLAVIX) 75 mg, oral, Daily     Allergies   Allergen Reactions    Hay Fever And Allergy Relief Itching and Runny nose     Objective   Vitals:    08/05/25 1355   BP: 112/70   Pulse: 69   SpO2: 96%   Weight: 85.7 kg (189 lb)   Height: 1.778 m (5' 10\")     Body mass index is 27.12 " kg/m².    BP Readings from Last 3 Encounters:   08/05/25 112/70   02/26/25 110/64   07/26/24 106/70      Wt Readings from Last 3 Encounters:   08/05/25 85.7 kg (189 lb)   02/26/25 88.5 kg (195 lb)   07/26/24 83.9 kg (185 lb)      No visits with results within 1 Month(s) from this visit.   Latest known visit with results is:   Office Visit on 02/26/2025   Component Date Value    Ventricular Rate 02/26/2025 58     Atrial Rate 02/26/2025 58     ID Interval 02/26/2025 166     QRS Duration 02/26/2025 98     QT Interval 02/26/2025 418     QTC Calculation(Bazett) 02/26/2025 410     P Axis 02/26/2025 69     R Axis 02/26/2025 76     T Bertha 02/26/2025 27     QRS Count 02/26/2025 9     Q Onset 02/26/2025 213     P Onset 02/26/2025 130     P Offset 02/26/2025 191     T Offset 02/26/2025 422     QTC Fredericia 02/26/2025 413     CHOLESTEROL, TOTAL 08/01/2025 91     HDL CHOLESTEROL 08/01/2025 39 (L)     TRIGLYCERIDES 08/01/2025 77     LDL-CHOLESTEROL 08/01/2025 36     CHOL/HDLC RATIO 08/01/2025 2.3     NON HDL CHOLESTEROL 08/01/2025 52     GLUCOSE 08/01/2025 80     UREA NITROGEN (BUN) 08/01/2025 13     CREATININE 08/01/2025 0.99     EGFR 08/01/2025 86     SODIUM 08/01/2025 140     POTASSIUM 08/01/2025 4.4     CHLORIDE 08/01/2025 105     CARBON DIOXIDE 08/01/2025 29     ELECTROLYTE BALANCE 08/01/2025 6 (L)     CALCIUM 08/01/2025 9.6     PROTEIN, TOTAL 08/01/2025 6.6     ALBUMIN 08/01/2025 4.3     BILIRUBIN, TOTAL 08/01/2025 0.7     ALKALINE PHOSPHATASE 08/01/2025 56     AST 08/01/2025 21     ALT 08/01/2025 19     WHITE BLOOD CELL COUNT 08/01/2025 6.9     RED BLOOD CELL COUNT 08/01/2025 4.61     HEMOGLOBIN 08/01/2025 13.7     HEMATOCRIT 08/01/2025 42.4     MCV 08/01/2025 92.0     MCH 08/01/2025 29.7     MCHC 08/01/2025 32.3     RDW 08/01/2025 12.2     PLATELET COUNT 08/01/2025 174     MPV 08/01/2025 9.1     HEMOGLOBIN A1c 08/01/2025 5.5     eAG (mg/dL) 08/01/2025 111     eAG (mmol/L) 08/01/2025 6.2      Physical  Exam  CONSTITUTIONAL - well nourished, well developed, looks like stated age, in no acute distress, not ill-appearing, and not tired appearing  SKIN - normal skin color and pigmentation, normal skin turgor without rash, lesions, or nodules visualized  HEAD - no trauma, normocephalic  EYES - extraocular muscles are intact, and normal external exam  CHEST - clear to auscultation, no wheezing, no crackles and no rales, good effort  CARDIAC - bradycardic rate and regular rhythm, no skipped beats, no murmur  ABDOMEN - no organomegaly, soft, nontender, non-distended  EXTREMITIES - no obvious or evident edema, no obvious or evident deformities  NEUROLOGICAL - alert, oriented and no focal signs  PSYCHIATRIC - alert, pleasant and cordial, age-appropriate    Assessment/Plan   Problem List Items Addressed This Visit    None  Visit Diagnoses         Encounter for immunization    -  Primary      Screening for colorectal cancer        Relevant Orders    Colonoscopy Screening; Average Risk Patient      Cigarette nicotine dependence with nicotine-induced disorder        Relevant Orders    CT lung screening low dose          Diagnoses and all orders for this visit:  Encounter for annual physical exam  Complete history and physical examination was performed  Requisition for low-dose CT scan and colonoscopy was ordered today  We will notify of test results once available and make treatment recommendations accordingly  Patient did recent lab work through cardiology which were reviewed showing CBC without signs of anemia or infection, CMP with normal kidney liver function and normal electrolytes, A1c of 5.5% and lipid panel within normal limits with LDL 39.  TSH or PSA was not included in the lab order  Patient willing to update those during next appointment  Willing to receive his shingles in the near future.  Educated about the importance of conducting a healthy lifestyle, following well-balanced diet and exercising  regularly    Screening for colorectal cancer  -     Colonoscopy Screening; Average Risk Patient; Future  Cigarette nicotine dependence with nicotine-induced disorder  -     CT lung screening low dose    Coronary artery disease due to lipid rich plaque  Presence of stent in right coronary artery  Continue following with cardiology per their recommendations  Encouraged to discuss with cardiology continuing use of Plavix  Meanwhile continue aspirin and Lipitor 80 daily      Thank you for letting us be a part of your care team.  Please call the office if you have further questions or concerns regarding your care    Otherwise, please follow-up in 12 months for continued care and refills as well as your yearly physical examination    Margarito Grubbs MD  PGY3,  Resident  08/05/25

## 2025-08-22 ENCOUNTER — HOSPITAL ENCOUNTER (OUTPATIENT)
Dept: RADIOLOGY | Facility: CLINIC | Age: 65
Discharge: HOME | End: 2025-08-22
Payer: COMMERCIAL

## 2025-08-22 PROCEDURE — 71271 CT THORAX LUNG CANCER SCR C-: CPT

## 2026-03-03 ENCOUNTER — APPOINTMENT (OUTPATIENT)
Dept: CARDIOLOGY | Facility: HOSPITAL | Age: 66
End: 2026-03-03
Payer: COMMERCIAL